# Patient Record
Sex: FEMALE | Race: BLACK OR AFRICAN AMERICAN | NOT HISPANIC OR LATINO | Employment: OTHER | URBAN - METROPOLITAN AREA
[De-identification: names, ages, dates, MRNs, and addresses within clinical notes are randomized per-mention and may not be internally consistent; named-entity substitution may affect disease eponyms.]

---

## 2017-06-07 ENCOUNTER — ALLSCRIPTS OFFICE VISIT (OUTPATIENT)
Dept: OTHER | Facility: OTHER | Age: 67
End: 2017-06-07

## 2017-06-13 ENCOUNTER — ALLSCRIPTS OFFICE VISIT (OUTPATIENT)
Dept: RADIOLOGY | Facility: CLINIC | Age: 67
End: 2017-06-13
Payer: MEDICARE

## 2017-07-24 ENCOUNTER — ALLSCRIPTS OFFICE VISIT (OUTPATIENT)
Dept: OTHER | Facility: OTHER | Age: 67
End: 2017-07-24

## 2017-08-31 ENCOUNTER — ALLSCRIPTS OFFICE VISIT (OUTPATIENT)
Dept: OTHER | Facility: OTHER | Age: 67
End: 2017-08-31

## 2017-12-15 ENCOUNTER — ALLSCRIPTS OFFICE VISIT (OUTPATIENT)
Dept: OTHER | Facility: OTHER | Age: 67
End: 2017-12-15

## 2017-12-15 DIAGNOSIS — R06.09 OTHER FORMS OF DYSPNEA: ICD-10-CM

## 2017-12-15 DIAGNOSIS — R07.9 CHEST PAIN: ICD-10-CM

## 2017-12-16 NOTE — PROGRESS NOTES
Assessment  1  Dyspnea on exertion (786 09) (R06 09)  2  Chest pain (786 50) (R07 9)  3  Benign hypertension (401 1) (I10)  4  Hyperlipidemia, unspecified hyperlipidemia type (272 4) (E78 5)    Plan   · EKG/ECG- POC; Status:Complete - Retrospective Authorization;   Done: [id number]  Perform: In Office; Due:64Krr2177; Last Updated Nurys Rodríguez; 12/15/2017 4:34:53 PM;Ordered; For:Chest pain; Ordered By:Sofia Mai;   · NM MYOCARDIAL PERFUSION SPECT (RX STRESS AND/OR REST); Status:Hold For -[employer]renNet; Requested for:[id number];   Perform:[facility] Radiology; Due:06Cmx4650; Last Updated Nurys Fraustoshayy; 12/15/2017 2:34:58 PM;Ordered; For:Chest pain; Ordered By:Sofia Mai;   · ECHO COMPLETE WITH CONTRAST IF INDICATED; Status:Hold For -Scheduling,Retrospective Authorization; Requested for:[id number];   Perform:[facility] Radiology; Due:67Zyd7459; Last Updated Nurysmyriam Rodríguez; 12/15/2017 2:34:57 PM;Ordered; For:Chest pain, Dyspnea on exertion; Ordered By:Sofia Mai;    Discussion/Summary    Chest pain/ dyspnea on exertion-EKG and office today shows normal sinus rhythm, no acute changes- patient has multiple risk factors for CAD--will obtain psychological nuclear stress test to evaluate for ischemic etiology  Patient's history would not be able to walk on the treadmill due to history of arthritis  patient is also limited by her shortness of breath with exertion- Will also obtain echocardiogram to evaluate LVEF and status of valves hypertension- blood pressure well controlled at this time, will continue present regimen hyperlipidemia- continue atorvastatin  Routine blood work monitored through PCP signs and symptoms of ACS /CAD, CHF, arrhythmia, valvular disorder discussed with patient and family  Patient advised to report any concerns  Risk factor modification reviewed head patient to continue regular follow-up with PCP   Low-sodium, low-cholesterol diet with activity as tolerated recommended  Medications and side effects reviewed  Patient to follow up after testing  The patient, patient's family was counseled regarding instructions for management,-- risk factor reductions,-- impressions,-- importance of compliance with treatment  The patient has the current Goals: Chest pain free  The patent has the current Barriers: None  Patient is able to Self-Care  Possible side effects of new medications were reviewed with the patient/guardian today  The treatment plan was reviewed with the patient/guardian  The patient/guardian understands and agrees with the treatment plan      Chief Complaint  Referral from primary care physician      History of Present Illness  HPI: Patient with history of hypertension, hyperlipidemia, diabetes mellitus, COPD presents with complaints of intermittent chest pain  Patient states that his symptoms are random and may occur with rest or with exertion  Patient is only able to vaguely described her symptoms  She states that it typically lasts seconds to minutes at a time and may radiate to the arm  Patient also admits to dyspnea on exertion  patient denies personal history of CAD /mi  However she does report family history of heart disease  Patient is a former smoker  She denies recent cardiac testing  Review of Systems     Cardiac: as noted in HPI,-- no chest pain,-- no fainting/blackouts,-- no signs of swelling-- and-- no palpitations present  Skin: No complaints of nonhealing sores or skin rash  Genitourinary: no blood in urine-- and-- no kidney problems  Psychological: No complaints of feeling depressed, anxiety, panic attacks, or difficulty concentrating  General: lack of energy/fatigue, but-- no changes in weight-- and-- no fever  Respiratory: shortness of breath, but-- no cough/sputum  HEENT: No complaints of serious problems, hearing problems, nose problems, throat problems, or snoring    Gastrointestinal: No complaints of liver problems, nausea, vomiting, heartburn, constipation, bloody stools, diarrhea, problems swallowing, adbominal pain, or rectal bleeding  Hematologic: No complaints of bleeding disorders, anemia, blood clots, or excessive brusing  Neurological: No complaints of numbness, tingling, dizziness, weakness, seizures, headaches, syncope or fainting, AM fatigue, daytime sleepiness, no witnessed apnea episodes  Musculoskeletal: arthritis     ROS reviewed  Active Problems  1  Acid reflux (530 81) (K21 9)  2  Chronic obstructive pulmonary disease (496) (J44 9)  3  Diabetic polyneuropathy (250 60,357 2) (E11 42)  4  Facet syndrome, lumbar (724 8) (M46 96)  5  Glaucoma (365 9) (H40 9)  6  Lumbar facet arthropathy (721 3) (M46 96)  7  Lumbar radiculopathy (724 4) (M54 16)  8  Lumbar spinal stenosis (724 02) (M48 061)  9  Macular degeneration (362 50) (H35 30)  10  Seasonal allergies (477 9) (J30 2)    Past Medical History   · History of asthma (V12 69) (Z87 09)   · History of cataract (V12 49) (Z86 69)   · History of diabetes mellitus (V12 29) (Z86 39)   · History of esophageal reflux (V12 79) (Z87 19)   · History of hypertension (V12 59) (Z86 79)   · Personal history of arthritis (V13 4) (Z87 39)    The active problems and past medical history were reviewed and updated today  Surgical History   · History of  Section   · History of Enteroscopic Polypectomy   · History of Sinus Surgery    The surgical history was reviewed and updated today         Family History  Father    · Family history of cerebrovascular accident (V17 1) (Z82 3)  Family History    · Family history of Back disorder   · Family history of cardiac disorder (V17 49) (Z82 49)   · Family history of cerebrovascular accident (V17 1) (Z82 3)   · Family history of diabetes mellitus (V18 0) (Z83 3)   · Family history of lung cancer (V16 1) (Z80 1)   · Family history of malignant neoplasm of uterus (V16 49) (Z80 49)   · Family history of neuropathy (V17 2) (Z82 0)    The family history was reviewed and updated today  Social History   · Former smoker (W61 12) (O49 959)   · Housewife or homemaker   ·    · No alcohol use  The social history was reviewed and updated today  Current Meds  1  Amlodipine-Atorvastatin 10-20 MG Oral Tablet; TAKE 1 TABLET DAILY; Therapy: (Recorded:27Oct2015) to Recorded  2  Aspirin 81 MG TABS; TAKE 1 TABLET DAILY; Therapy: 07AIX2190 to Recorded  3  Breo Ellipta AEPB; INHALE ML Every morning; Therapy: (Recorded:27Oct2016) to Recorded  4  Calcium + D TABS; Therapy: (Recorded:27Oct2016) to Recorded  5  Gabapentin 100 MG Oral Capsule; TAKE 1 CAPSULE TWICE DAILY; Last Rx:27Oct2015 Ordered  6  Janumet XR  MG Oral Tablet Extended Release 24 Hour; TAKE 2 TABLET Every morning; Therapy: 51UNG1536 to Recorded  7  Latanoprost 0 005 % Ophthalmic Solution; INSTILL 1 DROP IN BOTH EYES AT BEDTIME; Therapy: (Doyce Bio) to Recorded  8  Losartan Potassium 25 MG Oral Tablet; TAKE 1 TABLET DAILY; Therapy: 11HST5553 to (Evaluate:69Wjr9564) Recorded  9  Magnesium Oxide 400 (241 3 Mg) MG Oral Tablet; Therapy: ([phone]) to Recorded  10  Nasonex 50 MCG/ACT Nasal Suspension; Therapy: (Recorded:27Oct2016) to Recorded  11  Pazeo 0 7 % Ophthalmic Solution; Therapy: (Recorded:27Oct2016) to Recorded  12  PreserVision AREDS 2 Oral Capsule; TAKE 1 CAPSULE DAILY; Therapy: (Doyce Bio) to Recorded  13  Ventolin  (90 Base) MCG/ACT Inhalation Aerosol Solution; INHALE 1 TO 2 PUFFS  EVERY 4 TO 6 HOURS AS NEEDED; Therapy: ([id number]) to Recorded  14  Vitamin B12 100 MCG Oral Tablet; Therapy: ([phone]) to Recorded  15  Vitamin D3 5000 UNIT Oral Capsule; Therapy: (Recorded:57Bto4316) to Recorded    The medication list was reviewed and updated today  Allergies  1  ACE Inhibitors  2  Other  3   Seasonal    Vitals  Signs   Heart Rate: 532  Systolic: 834  Diastolic: 70  Height: 5 ft 3 in  Weight: 183 lb BMI Calculated: 32 42  BSA Calculated: 1 86  O2 Saturation: 96    Physical Exam   Constitutional  General appearance: No acute distress, well appearing and well nourished  Eyes  Conjunctiva and Sclera examination: Conjunctiva pink, sclera anicteric  Ears, Nose, Mouth, and Throat - External inspection of ears and nose: Normal without deformities or discharge  -- Oropharynx: Clear, nares are clear, mucous membranes are moist   Neck  Neck and thyroid: Normal, supple, trachea midline, no thyromegaly  Pulmonary  Respiratory effort: No increased work of breathing or signs of respiratory distress  Auscultation of lungs: Clear to auscultation, no rales, no rhonchi, no wheezing, good air movement  Cardiovascular  Palpation of heart: Normal PMI, no thrills  Auscultation of heart: Normal rate and rhythm, normal S1 and S2, no murmurs  Carotid pulses: Normal, 2+ bilaterally  Examination of extremities for edema and/or varicosities: Normal    Chest - Chest: Normal   Abdomen  Abdomen: Non-tender and no distention  Musculoskeletal Gait and station: Normal gait  -- Digits and nails: Normal without clubbing or cyanosis  Skin - Skin and subcutaneous tissue: Normal without rashes or lesions  Skin is warm and well perfused, normal turgor  Neurologic - Speech: Normal    Psychiatric - Orientation to person, place, and time: Normal -- Mood and affect: Normal       Future Appointments    Date/Time Provider Specialty Site   01/17/2018 02:00 PM MARGARETH Ma   Cardiology  [address]     Signatures   Electronically signed by : Raoul Serrato, [address]; Dec 15 2017  4:34PM EST                       (Author)    Electronically signed by : MARGARETH Demarco ; Dec 15 2017  4:44PM EST                       (Author)

## 2018-01-12 ENCOUNTER — HOSPITAL ENCOUNTER (OUTPATIENT)
Dept: NON INVASIVE DIAGNOSTICS | Facility: CLINIC | Age: 68
Discharge: HOME | End: 2018-01-12
Payer: MEDICARE

## 2018-01-12 ENCOUNTER — GENERIC CONVERSION - ENCOUNTER (OUTPATIENT)
Dept: CARDIOLOGY CLINIC | Facility: CLINIC | Age: 68
End: 2018-01-12

## 2018-01-12 DIAGNOSIS — R07.9 CHEST PAIN: ICD-10-CM

## 2018-01-12 DIAGNOSIS — R06.09 OTHER FORMS OF DYSPNEA: ICD-10-CM

## 2018-01-12 LAB
MAX DIASTOLIC BP: 66 MMHG
MAX HEART RATE: 114 BPM
MAX PREDICTED HEART RATE: 153 BPM
MAX. SYSTOLIC BP: 176 MMHG
PROTOCOL NAME: NORMAL
REASON FOR TERMINATION: NORMAL
TARGET HR FORMULA: NORMAL
TEST INDICATION: NORMAL
TIME IN EXERCISE PHASE: NORMAL

## 2018-01-12 PROCEDURE — A9502 TC99M TETROFOSMIN: HCPCS

## 2018-01-12 PROCEDURE — 93306 TTE W/DOPPLER COMPLETE: CPT

## 2018-01-12 PROCEDURE — 78452 HT MUSCLE IMAGE SPECT MULT: CPT

## 2018-01-12 PROCEDURE — 93017 CV STRESS TEST TRACING ONLY: CPT

## 2018-01-12 RX ORDER — AMINOPHYLLINE DIHYDRATE 25 MG/ML
50 INJECTION, SOLUTION INTRAVENOUS ONCE
Status: CANCELLED | OUTPATIENT
Start: 2018-01-12 | End: 2018-01-12

## 2018-01-12 RX ADMIN — REGADENOSON 0.4 MG: 0.08 INJECTION, SOLUTION INTRAVENOUS at 13:11

## 2018-01-13 VITALS
WEIGHT: 183 LBS | DIASTOLIC BLOOD PRESSURE: 72 MMHG | SYSTOLIC BLOOD PRESSURE: 126 MMHG | HEIGHT: 63 IN | BODY MASS INDEX: 32.43 KG/M2 | TEMPERATURE: 97.8 F | HEART RATE: 80 BPM

## 2018-01-14 VITALS
DIASTOLIC BLOOD PRESSURE: 86 MMHG | HEART RATE: 84 BPM | WEIGHT: 180 LBS | HEIGHT: 63 IN | RESPIRATION RATE: 16 BRPM | BODY MASS INDEX: 31.89 KG/M2 | SYSTOLIC BLOOD PRESSURE: 140 MMHG

## 2018-01-14 VITALS
WEIGHT: 181 LBS | DIASTOLIC BLOOD PRESSURE: 76 MMHG | SYSTOLIC BLOOD PRESSURE: 120 MMHG | BODY MASS INDEX: 32.07 KG/M2 | HEART RATE: 80 BPM | HEIGHT: 63 IN

## 2018-01-23 VITALS
OXYGEN SATURATION: 96 % | HEART RATE: 101 BPM | HEIGHT: 63 IN | SYSTOLIC BLOOD PRESSURE: 132 MMHG | WEIGHT: 183 LBS | BODY MASS INDEX: 32.43 KG/M2 | DIASTOLIC BLOOD PRESSURE: 70 MMHG

## 2018-04-04 RX ORDER — ASPIRIN 81 MG/1
81 TABLET ORAL DAILY
COMMUNITY

## 2018-04-04 RX ORDER — ERGOCALCIFEROL 1.25 MG/1
50000 CAPSULE ORAL
COMMUNITY

## 2018-04-04 RX ORDER — AMLODIPINE BESYLATE AND ATORVASTATIN CALCIUM 10; 20 MG/1; MG/1
1 TABLET, FILM COATED ORAL DAILY
COMMUNITY
End: 2019-04-04

## 2018-04-04 RX ORDER — ALBUTEROL SULFATE 90 UG/1
2 AEROSOL, METERED RESPIRATORY (INHALATION) EVERY 6 HOURS PRN
COMMUNITY
End: 2018-06-21

## 2018-04-04 RX ORDER — UREA 10 %
100 LOTION (ML) TOPICAL DAILY
COMMUNITY

## 2018-04-04 RX ORDER — LOSARTAN POTASSIUM 50 MG/1
50 TABLET ORAL DAILY
COMMUNITY

## 2018-04-04 RX ORDER — GABAPENTIN 100 MG/1
100 CAPSULE ORAL 3 TIMES DAILY
COMMUNITY

## 2018-04-05 ENCOUNTER — OFFICE VISIT (OUTPATIENT)
Dept: CARDIOLOGY CLINIC | Facility: CLINIC | Age: 68
End: 2018-04-05
Payer: MEDICARE

## 2018-04-05 VITALS
HEART RATE: 96 BPM | WEIGHT: 173 LBS | BODY MASS INDEX: 30.65 KG/M2 | DIASTOLIC BLOOD PRESSURE: 78 MMHG | OXYGEN SATURATION: 94 % | SYSTOLIC BLOOD PRESSURE: 130 MMHG | HEIGHT: 63 IN

## 2018-04-05 DIAGNOSIS — E78.2 MIXED HYPERLIPIDEMIA: ICD-10-CM

## 2018-04-05 DIAGNOSIS — I10 ESSENTIAL HYPERTENSION: ICD-10-CM

## 2018-04-05 DIAGNOSIS — I51.89 DIASTOLIC DYSFUNCTION: ICD-10-CM

## 2018-04-05 DIAGNOSIS — I05.9 MITRAL ANNULAR CALCIFICATION: ICD-10-CM

## 2018-04-05 DIAGNOSIS — R06.02 SHORTNESS OF BREATH ON EXERTION: ICD-10-CM

## 2018-04-05 DIAGNOSIS — R07.89 OTHER CHEST PAIN: Primary | ICD-10-CM

## 2018-04-05 PROCEDURE — 99214 OFFICE O/P EST MOD 30 MIN: CPT | Performed by: INTERNAL MEDICINE

## 2018-04-05 NOTE — PROGRESS NOTES
CARDIOLOGY OFFICE VISIT  [facility]  [address], [place], [facility] Rosalie Puente  Tel: [phone]      NAME: Bishop Erazo  AGE: 79 y o  SEX: female  : 1950   MRN: [id number]      Chief Complaint:  Chief Complaint   Patient presents with    Results     echo and stress test         History of Present Illness:   Patient with history of hypertension, hyperlipidemia, diabetes mellitus, COPD comes for f/u after cardiac testing  She had initially presented with complaints of intermittent chest pain  Patient's symptoms were random and occured with rest or with exertion  She stated that CP typically lasted for seconds to minutes at a time and may radiate to the arm  Patient also admitted to dyspnea on exertion  Pt denies palpitations, lightheadedness, syncope, swelling feet, orthopnea, PND, claudication  HTN, DD -  Has been hypertensive for many years  Taking medications regularly  Denies lightheadedness, headache, medication side effects  HLP -  Has had hyperlipidemia for many years  Taking statin regularly along with diet control  Denies myalgia  PCP closely monitoring the blood work        Past Medical History:  Past Medical History:   Diagnosis Date    COPD (chronic obstructive pulmonary disease) ([address] )     ANDRADE (dyspnea on exertion)     HTN (hypertension)     Hyperlipidemia          Family History:  Family History   Problem Relation Age of Onset    Stroke Father          Social History:  Social History     Social History    Marital status: /Civil Union     Spouse name: N/A    Number of children: N/A    Years of education: N/A     Social History Main Topics    Smoking status: Former Smoker    Smokeless tobacco: Not on file    Alcohol use No    Drug use: Unknown    Sexual activity: Not on file     Other Topics Concern    Not on file     Social History Narrative    No narrative on file         Active Problems:  Patient Active Problem List   Diagnosis    Other chest pain    Shortness of breath on exertion    Essential hypertension    Mixed hyperlipidemia    Diastolic dysfunction    Mitral annular calcification         The following portions of the patient's history were reviewed and updated as appropriate: past medical history, past surgical history, past family history,  past social history, current medications, allergies and problem list       Review of Systems:  Constitutional: Denies fever, chills, fatigue  Eyes: Denies eye redness, eye discharge, double vision  ENT: Denies hearing loss, tinnitus, sneezing, nasal discharge, sore throat   Respiratory: Denies cough, expectoration, hemoptysis, shortness of breath  Cardiovascular: Denies chest pain, palpitations, orthopnea, PND, lower extremity swelling  Gastrointestinal: Denies abdominal pain, nausea, vomiting, hematemesis, diarrhea, bloody stools  Genito-Urinary: Denies dysuria, incontinence  Musculoskeletal: Denies back pain, joint pain, muscle pain  Neurologic: Denies confusion, lightheadedness, syncope, headache, focal weakness, sensory changes, seizures  Endocrine: Denies polyuria, polydipsia, temperature intolerance  Allergy and Immunology: Denies hives, insect bite sensitivity  Hematological and Lymphatic: Denies bleeding problems, swollen glands   Psychological: Denies depression, suicidal ideation, anxiety, panic  Dermatological: Denies pruritus, rash, skin lesion changes      Vitals:  Vitals:    04/05/18 1137   BP: 130/78   Pulse: 96   SpO2: 94%       Body mass index is 30 65 kg/m²  Weight (last 2 days)     Date/Time   Weight    04/05/18 1137  78 5 (173)                Physical Examination:  General: Patient is not in acute distress  Awake, alert, oriented in time, place and person  Responding to commands  Head: Normocephalic  Atraumatic  Eyes: Both pupils normal sized, round and reactive to light  Nonicteric  ENT: Normal external ear canals  Nares normal, no drainage  Lips and oral mucosa normal  Neck: Supple  JVP not raised  Trachea central  No thyromegaly  Lungs: Bilateral bronchovascular breath sounds with no crackles or rhonchi  Chest wall: No tenderness  Cardiovascular: RRR  S1 and S2 normal  No murmur, rub or gallop  Gastrointestinal: Abdomen soft, nontender  No guarding or rigidity  Liver and spleen not palpable  Bowel sounds present  Neurologic: Patient is awake, alert, oriented in time, place and person  Responding to command  Moving all extremities  Integumentary:  No skin rash  Lymphatic: No cervical lymphadenopathy  Back: Symmetric  No CVA tenderness  Extremities: No clubbing, cyanosis or edema      Laboratory Results:    Cardiac testing:   Results for orders placed in visit on 18   Echo complete with contrast if indicated    Narrative [place] [address], [address] [facility]   [phone]     Transthoracic Echocardiogram   2D, M-mode, and Color Doppler     Study date:  2018     Patient: Freya Barone   MR number: [id number]   Account number: [de-identified]   : 1950   Age: 79 years   Gender: Female   Status: Outpatient   Location: [facility]   Height: 63 in   Weight: 183 lb   BP: 132/ 70 mmHg     Indications: Dyspnea  Diagnoses: R06 09 - Other forms of dyspnea     Sonographer:  Ursula Mitchell, GAYLE   Interpreting Physician:  Sandra Casarez MD   Primary Physician:  Rocio Shane MD   Referring Physician:  Sandra Casarez MD   Group:  [facility]     SUMMARY     LEFT VENTRICLE:   Systolic function was normal  Ejection fraction was estimated to be 60 %  There were no regional wall motion abnormalities  Doppler parameters were consistent with abnormal left ventricular relaxation (grade 1 diastolic dysfunction)  MITRAL VALVE:   There was mild annular calcification  TRICUSPID VALVE:   There was trace regurgitation       HISTORY: PRIOR HISTORY: Chest pain  Former smoker  Risk factors: hypertension, diabetes, and a family history of coronary artery disease  Chronic lung disease  PROCEDURE: The study was performed in the [address] [place] [address]  This was a routine study  The transthoracic approach was used  The study included complete 2D imaging, M-mode, and color Doppler  The heart rate was 96 bpm, at the   start of the study  Images were obtained from the parasternal, apical, subcostal, and suprasternal notch acoustic windows  Image quality was adequate  LEFT VENTRICLE: Size was normal  Systolic function was normal  Ejection fraction was estimated to be 60 %  There were no regional wall motion abnormalities  Wall thickness was normal  No evidence of apical thrombus  DOPPLER: Doppler   parameters were consistent with abnormal left ventricular relaxation (grade 1 diastolic dysfunction)  RIGHT VENTRICLE: The size was normal  Systolic function was normal  Wall thickness was normal      LEFT ATRIUM: Size was normal      RIGHT ATRIUM: Size was normal      MITRAL VALVE: There was mild annular calcification  There was normal leaflet separation  DOPPLER: The transmitral velocity was within the normal range  There was no evidence for stenosis  There was no significant regurgitation  AORTIC VALVE: The valve was trileaflet  Leaflets exhibited mildly increased thickness and normal cuspal separation  DOPPLER: Transaortic velocity was within the normal range  There was no evidence for stenosis  There was no significant   regurgitation  TRICUSPID VALVE: The valve structure was normal  There was normal leaflet separation  DOPPLER: The transtricuspid velocity was within the normal range  There was no evidence for stenosis  There was trace regurgitation  PULMONIC VALVE: Leaflets exhibited normal thickness, no calcification, and normal cuspal separation  DOPPLER: The transpulmonic velocity was within the normal range   There was no significant regurgitation  PERICARDIUM: There was no pericardial effusion  The pericardium was normal in appearance  AORTA: The root exhibited normal size  SYSTEMIC VEINS: IVC: The inferior vena cava was normal in size  SYSTEM MEASUREMENT TABLES     Apical four chamber   4 chamber Left Atrium Volume Index; Planimetry; End Systole; Apical four chamber;: 12 61 cm2   Left Ventricular Diastolic Area; Method of Disks, Single Plane; End Diastole; Apical four chamber;: 24 25 cm2   Left Ventricular Ejection Fraction; Method of Disks, Single Plane; Apical four chamber;: 60 9 %   Left Ventricular systolic Area; Method of Disks, Single Plane; End Systole; Apical four chamber;: 13 77 cm2   Right Atrium Systolic Area; Planimetry; End Systole; Apical four chamber;: 10 92 cm2   Right Ventricular Internal Diastolic Dimension; End Diastole; Apical four chamber;: 23 1 mm   TAPSE: 18 1 mm     Unspecified Scan Mode   Aortic Root Diameter; End Systole;: 30 8 mm   Aortic valve Area; Continuity Equation by Velocity Time Integral; Systole;: 2 32 cm2   Cardiovascular Orifice Diameter; End Systole;: 19 2 mm   Gradient Pressure, Peak; Simplified Bernoulli; Antegrade Flow; Systole;: 10 mm[Hg]   Gradient pressure, average; Simplified Bernoulli; Antegrade Flow; Systole;: 5 7 mm[Hg]   Left Atrium to Aortic Root Ratio;: 0 93   Left atrial diameter; End Diastole;: 28 6 mm   Cardiac Output; Teichholz; Systole;: 4 33 L/min   Heart rate; Teichholz;: 93 /min   Interventricular Septum Diastolic Thickness; Teichholz; End Diastole;: 8 6 mm   Left Ventricle Internal End Diastolic Dimension; Teichholz;: 39 2 mm   Left Ventricle Internal Systolic Dimension; Teichholz; End Systole;: 24 mm   Left Ventricle Mass; Mass AVCube with Teichholz; End Diastole;: 101 g   Left Ventricle Posterior Wall Diastolic Thickness; Teichholz; End Diastole;: 8 8 mm   Left Ventricular Ejection Fraction;  Teichholz;: 69 7 %   Left Ventricular End Diastolic Volume; Teichholz;: 66 7 ml   Left Ventricular End Systolic Volume; Teichholz;: 20 2 ml   Left Ventricular Fractional Shortening;: 38 8 %   Stroke volume; Teichholz; Systole;: 46 5 ml   Mitral Valve Area; Area by Pressure Half-Time; Systole;: 3 93 cm2   Mitral Valve E to A Ratio; Systole;: 0 88   Pressure half time; Diastole;: 0 06 s     [address] Accredited Echocardiography [facility]     Prepared and electronically signed by     Joaquín Ferris MD   Signed 12-Jan-2018 17:48:03       Medications:    Current Outpatient Prescriptions:     albuterol (VENTOLIN HFA) 90 mcg/act inhaler, Inhale 2 puffs every 6 (six) hours as needed for wheezing, Disp: , Rfl:     amLODIPine-atorvastatin (CADUET) 10-20 MG per tablet, Take 1 tablet by mouth daily, Disp: , Rfl:     aspirin (ECOTRIN LOW STRENGTH) 81 mg EC tablet, Take 81 mg by mouth daily, Disp: , Rfl:     ergocalciferol (VITAMIN D2) 50,000 units, Take 50,000 Units by mouth, Disp: , Rfl:     Fluticasone Furoate-Vilanterol (BREO ELLIPTA IN), Inhale, Disp: , Rfl:     gabapentin (NEURONTIN) 100 mg capsule, Take 100 mg by mouth 3 (three) times a day, Disp: , Rfl:     losartan (COZAAR) 25 mg tablet, Take 12 5 mg by mouth daily  , Disp: , Rfl:     magnesium oxide (MAG-OX) 400 mg, Take 400 mg by mouth 2 (two) times a day, Disp: , Rfl:     Multiple Vitamins-Minerals (PRESERVISION AREDS 2 PO), Take by mouth, Disp: , Rfl:     sitaGLIPtin-metFORMIN (JANUMET)  MG per tablet, Take 1 tablet by mouth 2 (two) times a day with meals, Disp: , Rfl:     vitamin B-12 (CYANOCOBALAMIN) 100 MCG tablet, Take 100 mcg by mouth daily, Disp: , Rfl:       Allergies:  No Known Allergies      Assessment and Plan:   2D echocardiogram and stress test reports discussed with the patient in detail  Patient was reassured  1  Other chest pain    Likely noncardiac in view of the echocardiogram and stress test reports  Patient to follow-up with PCP    2   Shortness of breath on exertion likely from her asthma  Patient states she is going to follow up with her pulmonologist    3  Essential hypertension   BP stable  Continue current medications    4  Mixed hyperlipidemia   continue statin  Her PCP closely monitor the blood work    5  Diastolic dysfunction   tight hypertension control    6  Mitral annular calcification   follow up with serial echocardiograms    Recommend aggressive risk factor modification and therapeutic lifestyle changes  Low-salt, low-calorie, low-fat, low-cholesterol diet with regular exercise and to optimize weight  I will defer the ordering and monitoring of necessity lab studies to you, but I am available and happy to review and manage any of the data at your request in the future  Discussed concepts of atherosclerosis, including signs and symptoms of cardiac disease  Previous studies were reviewed  Safety measures were reviewed  Questions were entertained and answered  Patient was advised to report any problems requiring medical attention  Follow-up with PCP and appropriate specialist and lab work as discussed  Return for follow up visit as scheduled or earlier, if needed  Thank you for allowing me to participate in the care and evaluation of your patient  Should you have any questions, please feel free to contact me        Claudetta Reichmann, MD  4/5/2018,11:58 AM

## 2018-05-29 ENCOUNTER — TELEPHONE (OUTPATIENT)
Dept: CARDIOLOGY CLINIC | Facility: CLINIC | Age: 68
End: 2018-05-29

## 2018-06-21 ENCOUNTER — OFFICE VISIT (OUTPATIENT)
Dept: OBGYN CLINIC | Facility: CLINIC | Age: 68
End: 2018-06-21
Payer: MEDICARE

## 2018-06-21 ENCOUNTER — APPOINTMENT (OUTPATIENT)
Dept: RADIOLOGY | Facility: CLINIC | Age: 68
End: 2018-06-21
Payer: MEDICARE

## 2018-06-21 VITALS
DIASTOLIC BLOOD PRESSURE: 84 MMHG | BODY MASS INDEX: 31.01 KG/M2 | WEIGHT: 175 LBS | SYSTOLIC BLOOD PRESSURE: 144 MMHG | HEART RATE: 102 BPM | HEIGHT: 63 IN

## 2018-06-21 DIAGNOSIS — M25.532 PAIN IN LEFT WRIST: ICD-10-CM

## 2018-06-21 DIAGNOSIS — M18.12 ARTHRITIS OF CARPOMETACARPAL (CMC) JOINT OF LEFT THUMB: Primary | ICD-10-CM

## 2018-06-21 PROCEDURE — 99204 OFFICE O/P NEW MOD 45 MIN: CPT | Performed by: FAMILY MEDICINE

## 2018-06-21 PROCEDURE — 73110 X-RAY EXAM OF WRIST: CPT

## 2018-06-21 RX ORDER — LANCETS 33 GAUGE
EACH MISCELLANEOUS
COMMUNITY
Start: 2018-04-08

## 2018-06-21 RX ORDER — LATANOPROST 50 UG/ML
0.01 SOLUTION/ DROPS OPHTHALMIC
COMMUNITY

## 2018-06-21 RX ORDER — NAPROXEN 500 MG/1
500 TABLET ORAL 2 TIMES DAILY WITH MEALS
Qty: 30 TABLET | Refills: 0 | Status: SHIPPED | OUTPATIENT
Start: 2018-06-21 | End: 2018-12-20

## 2018-06-21 RX ORDER — FLUTICASONE FUROATE AND VILANTEROL TRIFENATATE 100; 25 UG/1; UG/1
POWDER RESPIRATORY (INHALATION)
COMMUNITY
Start: 2018-06-14

## 2018-06-21 RX ORDER — MOMETASONE FUROATE 50 UG/1
SPRAY, METERED NASAL
COMMUNITY
Start: 2018-06-14

## 2018-06-21 RX ORDER — METRONIDAZOLE 10 MG/60G
CREAM TOPICAL
COMMUNITY
Start: 2018-06-04

## 2018-06-21 NOTE — PROGRESS NOTES
Assessment/Plan:  Assessment/Plan   Diagnoses and all orders for this visit:    Arthritis of carpometacarpal [place]) joint of left thumb  -     CMC brace  -     diclofenac sodium (VOLTAREN) 1 %; Apply 2 g topically 4 (four) times a day    Pain in left wrist  -     XR wrist 3+ vw left; Future  -     naproxen (NAPROSYN) 500 mg tablet; Take 1 tablet (500 mg total) by mouth 2 (two) times a day with meals  -     CMC brace  -     diclofenac sodium (VOLTAREN) 1 %; Apply 2 g topically 4 (four) times a day    Other orders  -     latanoprost (XALATAN) 0 005 % ophthalmic solution; 0 005 drops  -     mometasone (NASONEX) 50 mcg/act nasal spray;   -     ONETOUCH DELICA LANCETS 31F MISC;   -     Olopatadine HCl (PAZEO) 0 7 % SOLN; Apply to eye  -     Cholecalciferol (VITAMIN D-3) 5000 units TABS; Take by mouth  -     ONE TOUCH ULTRA TEST test strip;   -     BREO ELLIPTA 100-25 MCG/INH inhaler;   -     NORITATE 1 % cream;   -     Calcium Carb-Cholecalciferol (CALCIUM + D3 PO); Take by mouth  -     BIOTIN 5000 PO; Take by mouth        59-year-old right-hand-dominant female with left wrist pain  Discussed with patient physical exam, radiographs, impression, and plan  X-rays noted for mild degenerative changes of the [facility] joint  Physical exam is noted for tenderness of the [facility] joint of the left and mild pain with CMC grind, and no pain with Finkelstein's  I will prescribe her short course of naproxen 500 mg which she is to take twice daily with food consistently for 2 weeks, and Voltaren gel which she is to apply as directed  I will also provide her with soft CMC brace which she is to wear as needed  She will follow up with me in 3 months for evaluation, or she may turn sooner if symptoms worsen at which point we will consider corticosteroid injection  Subjective:   Patient ID: Jonathon Ceja is a 79 y o  female    Chief Complaint   Patient presents with    Left Wrist - Pain       59-year-old right-hand-dominant female presents for evaluation of left wrist pain and swelling of 2 months duration  She denies any particular trauma or inciting event and describes pain as gradual in onset, localized to the radial aspect of the wrist/base of the thumb area, constant, achy, sometimes sharp, worse with use of the thumb and gripping and twisting jars, and associated with numbness/tingling of the fingers intermittently, and swelling of the joint  She reports having tried anti-inflammatory without relief  Wrist Pain   This is a new problem  The current episode started more than 1 month ago  The problem occurs constantly  The problem has been gradually worsening  Associated symptoms include arthralgias, joint swelling and numbness  Pertinent negatives include no abdominal pain, chest pain, chills, fever, rash, sore throat or weakness  Exacerbated by: Gripping, thumb use  She has tried rest and NSAIDs for the symptoms  The treatment provided mild relief  The following portions of the patient's history were reviewed and updated as appropriate: She  has a past medical history of Asthma; COPD (chronic obstructive pulmonary disease) ([address] ); ANDRADE (dyspnea on exertion); HTN (hypertension); and Hyperlipidemia  She  has no past surgical history on file  Her family history includes Diabetes in her maternal grandmother; Lung cancer in her father; Stroke in her father and mother  She  reports that she has quit smoking  She has quit using smokeless tobacco  She reports that she does not drink alcohol or use drugs  She is allergic to ace inhibitors; other; and pollen extract       Review of Systems   Constitutional: Negative for chills and fever  HENT: Negative for sore throat  Eyes: Negative for visual disturbance  Respiratory: Negative for shortness of breath  Cardiovascular: Negative for chest pain  Gastrointestinal: Negative for abdominal pain  Genitourinary: Negative for difficulty urinating     Musculoskeletal: Positive for arthralgias and joint swelling  Skin: Negative for rash and wound  Neurological: Positive for numbness  Negative for weakness  Hematological: Does not bruise/bleed easily  Psychiatric/Behavioral: Negative for self-injury  Objective:  Vitals:    06/21/18 0851   BP: 144/84   BP Location: Left arm   Patient Position: Sitting   Cuff Size: Standard   Pulse: 102   Weight: 79 4 kg (175 lb)   Height: 5' 3" (1 6 m)     Left Hand Exam     Muscle Strength   The patient has normal left wrist strength  Tests   Cain Frederic: negative          Observations     Left Wrist/Hand   Negative for deformity  Tenderness     Left Wrist/Hand   Tenderness in the [address]  No tenderness in the triangular fibrocartilage complex  and scaphoid  Active Range of Motion     Left Wrist   Normal active range of motion    Additional Active Range of Motion Details  Left  - Normal range of motion of the fingers    Strength/Myotome Testing     Left Wrist/Hand   Normal wrist strength     (2nd hand position)     Trial 1: 5    Tests     Left Wrist/Hand   Positive CMC grind  Negative Finkelstein's  Physical Exam   Constitutional: She is oriented to person, place, and time  She appears well-developed  No distress  HENT:   Head: Normocephalic  Eyes: Conjunctivae are normal    Neck: No tracheal deviation present  Cardiovascular: Normal rate  Pulmonary/Chest: Effort normal  No respiratory distress  Abdominal: She exhibits no distension  Musculoskeletal:        Left hand: She exhibits no deformity  Neurological: She is alert and oriented to person, place, and time  Skin: Skin is warm and dry  Psychiatric: She has a normal mood and affect  Her behavior is normal    Nursing note and vitals reviewed  I have personally reviewed pertinent films in PACS and my interpretation is Mild degenerative change of left thumb CMC joint

## 2018-09-20 ENCOUNTER — OFFICE VISIT (OUTPATIENT)
Dept: OBGYN CLINIC | Facility: CLINIC | Age: 68
End: 2018-09-20
Payer: MEDICARE

## 2018-09-20 VITALS — WEIGHT: 177 LBS | HEIGHT: 63 IN | BODY MASS INDEX: 31.36 KG/M2

## 2018-09-20 DIAGNOSIS — M18.12 ARTHRITIS OF CARPOMETACARPAL (CMC) JOINT OF LEFT THUMB: Primary | ICD-10-CM

## 2018-09-20 PROCEDURE — 99214 OFFICE O/P EST MOD 30 MIN: CPT | Performed by: FAMILY MEDICINE

## 2018-09-20 PROCEDURE — 20600 DRAIN/INJ JOINT/BURSA W/O US: CPT | Performed by: FAMILY MEDICINE

## 2018-09-20 RX ORDER — TRIAMCINOLONE ACETONIDE 40 MG/ML
20 INJECTION, SUSPENSION INTRA-ARTICULAR; INTRAMUSCULAR
Status: COMPLETED | OUTPATIENT
Start: 2018-09-20 | End: 2018-09-20

## 2018-09-20 RX ORDER — MELOXICAM 15 MG/1
15 TABLET ORAL DAILY
Qty: 30 TABLET | Refills: 0 | Status: SHIPPED | OUTPATIENT
Start: 2018-09-20 | End: 2019-04-04

## 2018-09-20 RX ORDER — LIDOCAINE HYDROCHLORIDE 10 MG/ML
0.5 INJECTION, SOLUTION INFILTRATION; PERINEURAL
Status: COMPLETED | OUTPATIENT
Start: 2018-09-20 | End: 2018-09-20

## 2018-09-20 RX ORDER — LIDOCAINE HYDROCHLORIDE 10 MG/ML
1 INJECTION, SOLUTION INFILTRATION; PERINEURAL
Status: COMPLETED | OUTPATIENT
Start: 2018-09-20 | End: 2018-09-20

## 2018-09-20 RX ADMIN — TRIAMCINOLONE ACETONIDE 20 MG: 40 INJECTION, SUSPENSION INTRA-ARTICULAR; INTRAMUSCULAR at 13:18

## 2018-09-20 RX ADMIN — LIDOCAINE HYDROCHLORIDE 1 ML: 10 INJECTION, SOLUTION INFILTRATION; PERINEURAL at 13:18

## 2018-09-20 RX ADMIN — LIDOCAINE HYDROCHLORIDE 0.5 ML: 10 INJECTION, SOLUTION INFILTRATION; PERINEURAL at 13:18

## 2018-09-20 NOTE — PROGRESS NOTES
Assessment/Plan:  Assessment/Plan    Diagnoses and all orders for this visit:    Arthritis of carpometacarpal [place]) joint of left thumb  -     Small joint arthrocentesis  -     meloxicam (MOBIC) 15 mg tablet; Take 1 tablet (15 mg total) by mouth daily    Other orders  -     Cancel: Hand/upper extremity injection        77-year-old right-hand-dominant female with left thumb CMC joint arthritis  Discussed with patient physical exam, impression, and plan  Physical exam is noted for [facility] joint tenderness and mild pain with CMC grind  I discussed treatment option of corticosteroid injection to which she agreed  I administered mixture of 0 5 cc 1% lidocaine and 0 5 cc Kenalog to the left thumb CMC joint without complication  I will also have her take meloxicam 15 mg once daily with food for 30 days  She is also to start taking tumeric supplement once daily  She will follow up with me in 3 months at which point she will be re-evaluated  Subjective:   Patient ID: Ellie Noble is a 76 y o  female  Chief Complaint   Patient presents with    Left Wrist - Pain, Swelling, Follow-up       77-year-old right-hand-dominant female following up for left thumb CMC joint arthritis  She was last seen 3 months ago at which point she complained of pain of onset 2 months prior and was provided with [facility] brace and prescribed naproxen and topical Voltaren  Today she reports still having pain that is described as localized to the base of the thumb/CMC aspect, intermittent, achy and sometimes sharp, radiates distally to the distal aspect of the thumb, associated with swelling, and worse with gripping repetitive hand use  She has been doing a lot of packing and tending to [employer] which she states exacerbates her pain  She does report that uncertain days she does not have pain  She has been wearing CMC brace intermittently  Wrist Pain   This is a new problem  The current episode started more than 1 month ago   The problem occurs intermittently  The problem has been unchanged  Associated symptoms include arthralgias and joint swelling  Pertinent negatives include no numbness or weakness  Exacerbated by: Gripping  She has tried rest and NSAIDs for the symptoms  The treatment provided mild relief  Review of Systems   Musculoskeletal: Positive for arthralgias and joint swelling  Neurological: Negative for weakness and numbness  Objective:  Vitals:    09/20/18 1304   Weight: 80 3 kg (177 lb)   Height: 5' 3" (1 6 m)     Left Hand Exam     Muscle Strength   The patient has normal left wrist strength  Tests   Cristina Ast: negative          Observations     Left Wrist/Hand   Negative for deformity  Tenderness     Left Wrist/Hand   Tenderness in the first dorsal compartment and CMC  No tenderness in the scaphoid  Active Range of Motion     Left Wrist   Normal active range of motion    Additional Active Range of Motion Details  Left hand  -normal range of motion of fingers    Strength/Myotome Testing     Left Wrist/Hand   Normal wrist strength     (2nd hand position)     Trial 1: 5    Tests     Left Wrist/Hand   Positive CMC grind  Negative Finkelstein's  Physical Exam   Constitutional: She is oriented to person, place, and time  She appears well-developed  No distress  HENT:   Head: Normocephalic  Eyes: Conjunctivae are normal    Neck: No tracheal deviation present  Pulmonary/Chest: Effort normal  No respiratory distress  Abdominal: She exhibits no distension  Musculoskeletal: Normal range of motion  She exhibits tenderness  She exhibits no edema or deformity  Left hand: She exhibits no deformity  Neurological: She is alert and oriented to person, place, and time  She exhibits normal muscle tone  Coordination normal    Skin: Skin is warm and dry  No rash noted  No pallor  Psychiatric: She has a normal mood and affect   Her behavior is normal  Judgment and thought content normal  Nursing note and vitals reviewed              Small joint arthrocentesis  Date/Time: 9/20/2018 1:18 PM  Consent given by: patient  Site marked: site marked  Timeout: Immediately prior to procedure a time out was called to verify the correct patient, procedure, equipment, support staff and site/side marked as required   Supporting Documentation  Indications: pain   Procedure Details  Location: thumb - L thumb CMC  Preparation: Patient was prepped and draped in the usual sterile fashion  Needle size: 25 G  Ultrasound guidance: no  Approach: dorsal  Medications administered: 20 mg triamcinolone acetonide 40 mg/mL; 0 5 mL lidocaine 1 %; 1 mL lidocaine 1 %

## 2018-10-17 DIAGNOSIS — M18.12 ARTHRITIS OF CARPOMETACARPAL (CMC) JOINT OF LEFT THUMB: ICD-10-CM

## 2018-10-17 RX ORDER — MELOXICAM 15 MG/1
TABLET ORAL
Qty: 30 TABLET | Refills: 0 | OUTPATIENT
Start: 2018-10-17

## 2018-12-20 ENCOUNTER — OFFICE VISIT (OUTPATIENT)
Dept: OBGYN CLINIC | Facility: CLINIC | Age: 68
End: 2018-12-20
Payer: MEDICARE

## 2018-12-20 VITALS
DIASTOLIC BLOOD PRESSURE: 81 MMHG | WEIGHT: 178 LBS | HEART RATE: 83 BPM | HEIGHT: 63 IN | SYSTOLIC BLOOD PRESSURE: 137 MMHG | BODY MASS INDEX: 31.54 KG/M2

## 2018-12-20 DIAGNOSIS — M25.532 PAIN AND SWELLING OF LEFT WRIST: Primary | ICD-10-CM

## 2018-12-20 DIAGNOSIS — M25.432 PAIN AND SWELLING OF LEFT WRIST: Primary | ICD-10-CM

## 2018-12-20 DIAGNOSIS — M18.12 ARTHRITIS OF CARPOMETACARPAL (CMC) JOINT OF LEFT THUMB: ICD-10-CM

## 2018-12-20 PROCEDURE — 99213 OFFICE O/P EST LOW 20 MIN: CPT | Performed by: FAMILY MEDICINE

## 2018-12-20 NOTE — PROGRESS NOTES
Assessment/Plan:  Assessment/Plan   Diagnoses and all orders for this visit:    Pain and swelling of left wrist  -     MRI wrist left wo contrast; Future  -     Cock Up Wrist Splint    Arthritis of carpometacarpal (CMC) joint of left thumb      22-year-old right-hand-dominant female left wrist pain and swelling more than 10 months duration  Discussed with patient physical exam, impression and plan  Review of her x-rays are unremarkable for gross osseous abnormality  Physical exam is noted for swelling at the dorsal and volar aspect of the radiocarpal joint, as well as tenderness  She has intact range of motion however pain with resisted strength testing  She has not improved with being on meloxicam 15 mg, taking ibuprofen as needed, and after receiving corticosteroid injection  At this time I will refer her for MRI of the left wrist to evaluate for occult osseous abnormality and soft tissue abnormality of the left wrist   I will place her in cock-up wrist splint which she is to wear  She will follow up after getting MRI done  Subjective:   Patient ID: Maryanne Fleming is a 76 y o  female  Chief Complaint   Patient presents with    Left Wrist - Follow-up, Pain, Swelling       22-year-old right-hand-dominant female following up for left wrist pain and swelling more than 10 months duration  She was last seen 3 months ago which point she received corticosteroid injection to the [facility] joint of the thumb and prescribed meloxicam 15 mg once daily  She reports having improved pain which lasted only 2-3 weeks  Afterward she had return of pain described as localized to the radial aspect of the wrist, constant, achy and throbbing, nonradiating, associated swelling, and worse with repetitive movement of the wrist   She has been wearing CMC brace intermittently and she completed course of meloxicam, and has been taking ibuprofen as needed which provides only mild improvement    She has also been taking tumeric twice daily       Wrist Pain   This is a chronic problem  The current episode started more than 1 month ago  The problem occurs constantly  The problem has been unchanged  Associated symptoms include arthralgias and joint swelling  Pertinent negatives include no numbness or weakness  Exacerbated by: Wrist use  She has tried NSAIDs (Corticosteroid injection) for the symptoms  The treatment provided mild relief  Review of Systems   Musculoskeletal: Positive for arthralgias and joint swelling  Neurological: Negative for weakness and numbness  Objective:  Vitals:    12/20/18 1138   BP: 137/81   Pulse: 83   Weight: 80 7 kg (178 lb)   Height: 5' 3" (1 6 m)     Left Hand Exam     Muscle Strength   Wrist Extension: 5/5   Wrist Flexion: 5/5     Tests   Finkelstein: negative          Observations     Left Wrist/Hand   Negative for deformity  Additional Observation Details  Left wrist swelling at radiocarpal joint    Tenderness     Left Wrist/Hand   Tenderness in the first dorsal compartment, second dorsal compartment and lunate  No tenderness in the sixth dorsal compartment, carpometacarpal joint and scaphoid  Active Range of Motion     Left Wrist   Wrist flexion: WFL and with pain  Wrist extension: WFL and with pain    Additional Active Range of Motion Details  Normal range of motion of fingers of left hand    Strength/Myotome Testing     Left Wrist/Hand   Wrist extension: 5  Wrist flexion: 5     (2nd hand position)     Trial 1: 5    Additional Strength Details  Left wrist swelling at radiocarpal joint    Tests     Left Wrist/Hand   Negative AIN OK sign, CMC grind and Finkelstein's  Physical Exam   Constitutional: She is oriented to person, place, and time  She appears well-developed  No distress  HENT:   Head: Normocephalic  Eyes: Conjunctivae are normal    Neck: No tracheal deviation present  Cardiovascular: Normal rate  Pulmonary/Chest: Effort normal  No respiratory distress  Abdominal: She exhibits no distension  Musculoskeletal:        Left hand: She exhibits no deformity  Neurological: She is alert and oriented to person, place, and time  Skin: Skin is warm and dry  Psychiatric: She has a normal mood and affect  Her behavior is normal    Nursing note and vitals reviewed

## 2019-01-08 ENCOUNTER — HOSPITAL ENCOUNTER (OUTPATIENT)
Dept: MRI IMAGING | Facility: CLINIC | Age: 69
Discharge: HOME | End: 2019-01-08
Payer: MEDICARE

## 2019-01-08 DIAGNOSIS — M25.432 PAIN AND SWELLING OF LEFT WRIST: ICD-10-CM

## 2019-01-08 DIAGNOSIS — M25.532 PAIN AND SWELLING OF LEFT WRIST: ICD-10-CM

## 2019-01-08 PROCEDURE — 73221 MRI JOINT UPR EXTREM W/O DYE: CPT

## 2019-01-14 ENCOUNTER — OFFICE VISIT (OUTPATIENT)
Dept: OBGYN CLINIC | Facility: CLINIC | Age: 69
End: 2019-01-14
Payer: MEDICARE

## 2019-01-14 VITALS
DIASTOLIC BLOOD PRESSURE: 83 MMHG | WEIGHT: 176 LBS | HEIGHT: 63 IN | HEART RATE: 86 BPM | SYSTOLIC BLOOD PRESSURE: 130 MMHG | BODY MASS INDEX: 31.18 KG/M2

## 2019-01-14 DIAGNOSIS — M25.332 SCAPHOLUNATE DISSOCIATION OF LEFT WRIST: ICD-10-CM

## 2019-01-14 DIAGNOSIS — M19.032 ARTHRITIS OF LEFT WRIST: Primary | ICD-10-CM

## 2019-01-14 PROCEDURE — 99213 OFFICE O/P EST LOW 20 MIN: CPT | Performed by: FAMILY MEDICINE

## 2019-01-14 RX ORDER — MELOXICAM 15 MG/1
15 TABLET ORAL DAILY
Qty: 30 TABLET | Refills: 2 | Status: SHIPPED | OUTPATIENT
Start: 2019-01-14 | End: 2019-04-04

## 2019-01-14 NOTE — PROGRESS NOTES
Assessment/Plan:  Assessment/Plan   Diagnoses and all orders for this visit:    Arthritis of left wrist  -     meloxicam (MOBIC) 15 mg tablet; Take 1 tablet (15 mg total) by mouth daily    Scapholunate dissociation of left wrist      30-year-old right-hand-dominant female with left wrist pain and swelling nearly 1 year duration  Discussed with patient physical exam, MRI findings, impression, plan  MRI of the left wrist is noted for well corticated erosion in the dorsal aspect of the lunate, and degenerative changes at the scapholunate and lunotriquetral ligaments  Physical exam is noted for tenderness upon palpation dorsum aspect the wrist over the scaphoid and lunate  I discussed with patient treatment option of intra-articular corticosteroid injection to which she declined at this time, as her pain has mildly improved since her last visit  She is to continue with taking joint supplements and continue with taking meloxicam 15 mg once daily with food  She will follow up with me as needed  Subjective:   Patient ID: Elsy Ram is a 76 y o  female  Chief Complaint   Patient presents with    Left Wrist - Pain, Follow-up       30-year-old right-hand-dominant female following up for left wrist pain of nearly 1 year duration  She was last seen more than 3 weeks ago at which point she was referred for MRI of the wrist   Today she reports mild improvement since her last visit  She has been taking joint supplements of tumeric and has been wearing CMC brace intermittently  She has pain described as generalized to the wrist but worse at the radial aspect, constant, achy, fluctuating in intensity, worse with twisting and repetitive motion, and improved with rest       Wrist Pain   This is a chronic problem  The current episode started more than 1 month ago  The problem occurs constantly  The problem has been gradually improving  Associated symptoms include arthralgias and joint swelling   Pertinent negatives include no numbness or weakness  Exacerbated by: Wrist movement  She has tried immobilization and NSAIDs (Left thumb CMC joint corticosteroid injection) for the symptoms  The treatment provided mild relief  Review of Systems   Musculoskeletal: Positive for arthralgias and joint swelling  Neurological: Negative for weakness and numbness  Objective:  Vitals:    01/14/19 1109   BP: 130/83   Pulse: 86   Weight: 79 8 kg (176 lb)   Height: 5' 3" (1 6 m)     Left Hand Exam     Muscle Strength   Wrist Extension: 4+/5   Wrist Flexion: 4+/5     Tests   Finkelstein: negative          Observations     Left Wrist/Hand   Negative for deformity  Tenderness     Left Wrist/Hand   Tenderness in the CMC, scaphoid (Mild) and lunate  No tenderness in the first dorsal compartment, sixth dorsal compartment and triangular fibrocartilage complex   Active Range of Motion     Left Wrist   Wrist flexion: WFL and with pain  Wrist extension: WFL and with pain    Additional Active Range of Motion Details  Normal range of motion of fingers of left hand    Strength/Myotome Testing     Left Wrist/Hand   Wrist extension: 4+  Wrist flexion: 4+     (2nd hand position)     Trial 1: 5    Tests     Left Wrist/Hand   Negative AIN OK sign, CMC grind and Finkelstein's  Physical Exam   Constitutional: She is oriented to person, place, and time  She appears well-developed  No distress  HENT:   Head: Normocephalic  Eyes: Conjunctivae are normal    Neck: No tracheal deviation present  Cardiovascular: Normal rate  Pulmonary/Chest: Effort normal  No respiratory distress  Abdominal: She exhibits no distension  Musculoskeletal:        Left hand: She exhibits no deformity  Neurological: She is alert and oriented to person, place, and time  Skin: Skin is warm and dry  Psychiatric: She has a normal mood and affect  Her behavior is normal    Nursing note and vitals reviewed        I have personally reviewed pertinent films in PACS and my interpretation is Arthritis of intercarpal joints of left wrist, scapholunate dissociation

## 2019-04-04 ENCOUNTER — OFFICE VISIT (OUTPATIENT)
Dept: CARDIOLOGY CLINIC | Facility: CLINIC | Age: 69
End: 2019-04-04
Payer: MEDICARE

## 2019-04-04 VITALS
SYSTOLIC BLOOD PRESSURE: 124 MMHG | HEART RATE: 95 BPM | OXYGEN SATURATION: 96 % | DIASTOLIC BLOOD PRESSURE: 78 MMHG | BODY MASS INDEX: 32.78 KG/M2 | WEIGHT: 185 LBS | HEIGHT: 63 IN

## 2019-04-04 DIAGNOSIS — E66.9 OBESITY (BMI 30-39.9): ICD-10-CM

## 2019-04-04 DIAGNOSIS — E78.2 MIXED HYPERLIPIDEMIA: ICD-10-CM

## 2019-04-04 DIAGNOSIS — I05.9 MITRAL ANNULAR CALCIFICATION: ICD-10-CM

## 2019-04-04 DIAGNOSIS — I51.89 DIASTOLIC DYSFUNCTION: ICD-10-CM

## 2019-04-04 DIAGNOSIS — I10 ESSENTIAL HYPERTENSION: Primary | ICD-10-CM

## 2019-04-04 PROCEDURE — 99214 OFFICE O/P EST MOD 30 MIN: CPT | Performed by: INTERNAL MEDICINE

## 2019-04-04 RX ORDER — AMLODIPINE BESYLATE 10 MG/1
10 TABLET ORAL DAILY
Qty: 90 TABLET | Refills: 3 | Status: SHIPPED | OUTPATIENT
Start: 2019-04-04

## 2019-04-04 RX ORDER — ATORVASTATIN CALCIUM 20 MG/1
20 TABLET, FILM COATED ORAL DAILY
Qty: 90 TABLET | Refills: 3 | Status: SHIPPED | OUTPATIENT
Start: 2019-04-04